# Patient Record
Sex: MALE | Race: BLACK OR AFRICAN AMERICAN | Employment: FULL TIME
[De-identification: names, ages, dates, MRNs, and addresses within clinical notes are randomized per-mention and may not be internally consistent; named-entity substitution may affect disease eponyms.]

---
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
## 2021-06-29 ENCOUNTER — HOSPITAL ENCOUNTER (OUTPATIENT)
Dept: MRI IMAGING | Age: 21
Discharge: HOME | End: 2021-07-01
Payer: COMMERCIAL

## 2021-06-29 DIAGNOSIS — H53.9 VISION CHANGES: ICD-10-CM

## 2021-06-29 DIAGNOSIS — R41.89 BRAIN FOG: ICD-10-CM

## 2021-06-29 DIAGNOSIS — Z87.820 HISTORY OF CONCUSSION: ICD-10-CM

## 2021-06-29 PROCEDURE — 70551 MRI BRAIN STEM W/O DYE: CPT

## 2021-07-02 ENCOUNTER — HOSPITAL ENCOUNTER (OUTPATIENT)
Dept: NEUROLOGY | Age: 21
Discharge: HOME | End: 2021-07-02
Payer: COMMERCIAL

## 2021-07-02 DIAGNOSIS — Z91.09 ENVIRONMENTAL ALLERGIES: ICD-10-CM

## 2021-07-02 DIAGNOSIS — J45.990 BRONCHOSPASM, EXERCISE-INDUCED: ICD-10-CM

## 2021-07-02 PROCEDURE — 94729 DIFFUSING CAPACITY: CPT

## 2021-07-02 PROCEDURE — 6370000000 HC RX 637 (ALT 250 FOR IP): Performed by: NURSE PRACTITIONER

## 2021-07-02 PROCEDURE — 94726 PLETHYSMOGRAPHY LUNG VOLUMES: CPT

## 2021-07-02 PROCEDURE — 94060 EVALUATION OF WHEEZING: CPT

## 2021-07-02 RX ORDER — ALBUTEROL SULFATE 90 UG/1
2 AEROSOL, METERED RESPIRATORY (INHALATION) ONCE
Status: COMPLETED | OUTPATIENT
Start: 2021-07-02 | End: 2021-07-02

## 2021-07-02 RX ADMIN — ALBUTEROL SULFATE 2 PUFF: 90 AEROSOL, METERED RESPIRATORY (INHALATION) at 09:25

## 2021-07-05 LAB
DLCO %PRED: 98 %
DLCO PRED: NORMAL
DLCO/VA %PRED: NORMAL
DLCO/VA PRED: NORMAL
DLCO/VA: NORMAL
DLCO: NORMAL
EXPIRATORY TIME-POST: NORMAL
EXPIRATORY TIME: NORMAL
FEF 25-75% %CHNG: NORMAL
FEF 25-75% %PRED-POST: NORMAL
FEF 25-75% %PRED-PRE: NORMAL
FEF 25-75% PRED: NORMAL
FEF 25-75%-POST: NORMAL
FEF 25-75%-PRE: NORMAL
FEV1 %PRED-POST: 112 %
FEV1 %PRED-PRE: 106 %
FEV1 PRED: NORMAL
FEV1-POST: NORMAL
FEV1-PRE: NORMAL
FEV1/FVC %PRED-POST: NORMAL
FEV1/FVC %PRED-PRE: NORMAL
FEV1/FVC PRED: NORMAL
FEV1/FVC-POST: 104 %
FEV1/FVC-PRE: 104 %
FVC %PRED-POST: NORMAL
FVC %PRED-PRE: NORMAL
FVC PRED: NORMAL
FVC-POST: NORMAL
FVC-PRE: NORMAL
GAW %PRED: NORMAL
GAW PRED: NORMAL
GAW: NORMAL
IC %PRED: NORMAL
IC PRED: NORMAL
IC: NORMAL
MEP: NORMAL
MIP: NORMAL
MVV %PRED-PRE: NORMAL
MVV PRED: NORMAL
MVV-PRE: NORMAL
PEF %PRED-POST: NORMAL
PEF %PRED-PRE: NORMAL
PEF PRED: NORMAL
PEF%CHNG: NORMAL
PEF-POST: NORMAL
PEF-PRE: NORMAL
RAW %PRED: NORMAL
RAW PRED: NORMAL
RAW: NORMAL
RV %PRED: NORMAL
RV PRED: NORMAL
RV: NORMAL
SVC %PRED: NORMAL
SVC PRED: NORMAL
SVC: NORMAL
TLC %PRED: 95 %
TLC PRED: NORMAL
TLC: NORMAL
VA %PRED: NORMAL
VA PRED: NORMAL
VA: NORMAL
VTG %PRED: NORMAL
VTG PRED: NORMAL
VTG: NORMAL

## 2021-07-05 ASSESSMENT — PULMONARY FUNCTION TESTS
FEV1/FVC_PRE: 104
FEV1_PERCENT_PREDICTED_POST: 112
FEV1_PERCENT_PREDICTED_PRE: 106
FEV1/FVC_POST: 104

## 2021-07-06 NOTE — PROCEDURES
[address] [facility] [address]                [address] [place] [address]                               PULMONARY FUNCTION    PATIENT NAME: Anthony Larose                 :        2000  MED REC NO:   [id number]                             ROOM:  ACCOUNT NO:   [de-identified]                           ADMIT DATE: 2021  PROVIDER:     Serena Lee MD    DATE OF PROCEDURE:  2021    TYPE OF STUDY:  Complete PFT. RESULTS:  The patient had FEV1 of 4.5 which is normal at 106% predicted. FVC was 5.01 which is normal at 100% predicted. FEV1/FVC was 90. The  mid-flows were normal at 114% predicted. Total lung capacity was normal  at 95% predicted. The residual volume was increased to 138% predicted. Diffusion was normal at 98% predicted. Following bronchodilators, there  was no significant change in the flows. IMPRESSION:  Air trapping without obstruction or restriction. No  response to bronchodilators. Clinical correlation is recommended.         Marly Jeronimo MD    D: 2021 22:39:10       T: 2021 4:46:14     [id number]  Job#: [id number]     Doc#: [id number]    CC:

## 2021-12-10 ENCOUNTER — HOSPITAL ENCOUNTER (OUTPATIENT)
Age: 21
Setting detail: SPECIMEN
Discharge: HOME | End: 2021-12-10

## 2021-12-10 DIAGNOSIS — Z13.220 SCREENING, LIPID: ICD-10-CM

## 2021-12-11 LAB
CHOLESTEROL, FASTING: 144 MG/DL
CHOLESTEROL/HDL RATIO: 3
HDLC SERPL-MCNC: 48 MG/DL
LDL CHOLESTEROL: 91 MG/DL (ref 0–130)
TRIGLYCERIDE, FASTING: 26 MG/DL
VLDLC SERPL CALC-MCNC: NORMAL MG/DL (ref 1–30)

## 2022-01-03 ENCOUNTER — HOSPITAL ENCOUNTER (OUTPATIENT)
Age: 22
Setting detail: SPECIMEN
Discharge: HOME | End: 2022-01-03

## 2022-01-03 DIAGNOSIS — R20.0 NUMBNESS OF TOES: ICD-10-CM

## 2022-01-03 DIAGNOSIS — Z13.9 SCREENING FOR CONDITION: ICD-10-CM

## 2022-01-03 LAB
ABSOLUTE EOS #: 0.27 K/UL (ref 0–0.44)
ABSOLUTE IMMATURE GRANULOCYTE: <0.03 K/UL (ref 0–0.3)
ABSOLUTE LYMPH #: 2.42 K/UL (ref 1.2–5.2)
ABSOLUTE MONO #: 0.4 K/UL (ref 0.1–1.4)
ALBUMIN SERPL-MCNC: 4.6 G/DL (ref 3.5–5.2)
ALBUMIN/GLOBULIN RATIO: 1.4 (ref 1–2.5)
ALP BLD-CCNC: 71 U/L (ref 40–129)
ALT SERPL-CCNC: 18 U/L (ref 5–41)
ANION GAP SERPL CALCULATED.3IONS-SCNC: 12 MMOL/L (ref 9–17)
AST SERPL-CCNC: 15 U/L
BASOPHILS # BLD: 1 % (ref 0–2)
BASOPHILS ABSOLUTE: 0.03 K/UL (ref 0–0.2)
BILIRUB SERPL-MCNC: 0.3 MG/DL (ref 0.3–1.2)
BUN BLDV-MCNC: 19 MG/DL (ref 6–20)
BUN/CREAT BLD: NORMAL (ref 9–20)
CALCIUM SERPL-MCNC: 9.8 MG/DL (ref 8.6–10.4)
CHLORIDE BLD-SCNC: 98 MMOL/L (ref 98–107)
CO2: 26 MMOL/L (ref 20–31)
CREAT SERPL-MCNC: 0.94 MG/DL (ref 0.7–1.2)
DIFFERENTIAL TYPE: ABNORMAL
EOSINOPHILS RELATIVE PERCENT: 5 % (ref 1–4)
GFR AFRICAN AMERICAN: >60 ML/MIN
GFR NON-AFRICAN AMERICAN: >60 ML/MIN
GFR SERPL CREATININE-BSD FRML MDRD: NORMAL ML/MIN/{1.73_M2}
GFR SERPL CREATININE-BSD FRML MDRD: NORMAL ML/MIN/{1.73_M2}
GLUCOSE BLD-MCNC: 84 MG/DL (ref 70–99)
HCT VFR BLD CALC: 45 % (ref 40.7–50.3)
HEMOGLOBIN: 14 G/DL (ref 13–17)
IMMATURE GRANULOCYTES: 0 %
LYMPHOCYTES # BLD: 43 % (ref 25–45)
MCH RBC QN AUTO: 26.7 PG (ref 25.2–33.5)
MCHC RBC AUTO-ENTMCNC: 31.1 G/DL (ref 28.4–34.8)
MCV RBC AUTO: 85.9 FL (ref 82.6–102.9)
MONOCYTES # BLD: 7 % (ref 2–8)
NRBC AUTOMATED: 0 PER 100 WBC
PDW BLD-RTO: 13.7 % (ref 11.8–14.4)
PLATELET # BLD: 197 K/UL (ref 138–453)
PLATELET ESTIMATE: ABNORMAL
PMV BLD AUTO: 12.1 FL (ref 8.1–13.5)
POTASSIUM SERPL-SCNC: 4.4 MMOL/L (ref 3.7–5.3)
RBC # BLD: 5.24 M/UL (ref 4.21–5.77)
RBC # BLD: ABNORMAL 10*6/UL
SEG NEUTROPHILS: 44 % (ref 34–64)
SEGMENTED NEUTROPHILS ABSOLUTE COUNT: 2.55 K/UL (ref 1.8–8)
SODIUM BLD-SCNC: 136 MMOL/L (ref 135–144)
TOTAL PROTEIN: 7.9 G/DL (ref 6.4–8.3)
WBC # BLD: 5.7 K/UL (ref 4.5–13.5)
WBC # BLD: ABNORMAL 10*3/UL

## 2023-10-12 ENCOUNTER — OFFICE VISIT (OUTPATIENT)
Dept: INTERNAL MEDICINE CLINIC | Facility: CLINIC | Age: 23
End: 2023-10-12

## 2023-10-12 VITALS
DIASTOLIC BLOOD PRESSURE: 80 MMHG | SYSTOLIC BLOOD PRESSURE: 120 MMHG | BODY MASS INDEX: 23.38 KG/M2 | WEIGHT: 167 LBS | HEIGHT: 71 IN | HEART RATE: 84 BPM

## 2023-10-12 DIAGNOSIS — E55.9 VITAMIN D DEFICIENCY: ICD-10-CM

## 2023-10-12 DIAGNOSIS — Z00.00 ANNUAL PHYSICAL EXAM: Primary | ICD-10-CM

## 2023-10-12 DIAGNOSIS — E53.9 VITAMIN B DEFICIENCY: ICD-10-CM

## 2023-10-12 DIAGNOSIS — T78.40XA ALLERGY, INITIAL ENCOUNTER: ICD-10-CM

## 2023-10-12 PROCEDURE — 3074F SYST BP LT 130 MM HG: CPT | Performed by: NURSE PRACTITIONER

## 2023-10-12 PROCEDURE — 3008F BODY MASS INDEX DOCD: CPT | Performed by: NURSE PRACTITIONER

## 2023-10-12 PROCEDURE — 3079F DIAST BP 80-89 MM HG: CPT | Performed by: NURSE PRACTITIONER

## 2023-10-12 PROCEDURE — 99203 OFFICE O/P NEW LOW 30 MIN: CPT | Performed by: NURSE PRACTITIONER

## 2023-10-12 NOTE — ASSESSMENT & PLAN NOTE
Allergies- Swelling of feet and hands. Palms red after eating certain foods.       Plan  -Zyrtec 10 mg p.o. at at bedtime  -Keep windows closed  -Covered pillow with allergic slip cover   Allergy food panel  Annual lab

## 2023-10-16 ENCOUNTER — LAB ENCOUNTER (OUTPATIENT)
Dept: LAB | Age: 23
End: 2023-10-16
Attending: NURSE PRACTITIONER
Payer: COMMERCIAL

## 2023-10-16 DIAGNOSIS — E55.9 VITAMIN D DEFICIENCY: ICD-10-CM

## 2023-10-16 DIAGNOSIS — R74.8 ELEVATED LIVER ENZYMES: Primary | ICD-10-CM

## 2023-10-16 DIAGNOSIS — E53.9 VITAMIN B DEFICIENCY: ICD-10-CM

## 2023-10-16 DIAGNOSIS — Z00.00 ANNUAL PHYSICAL EXAM: ICD-10-CM

## 2023-10-16 LAB
ALBUMIN SERPL-MCNC: 4 G/DL (ref 3.4–5)
ALBUMIN/GLOB SERPL: 1 {RATIO} (ref 1–2)
ALP LIVER SERPL-CCNC: 60 U/L
ALT SERPL-CCNC: 122 U/L
ANION GAP SERPL CALC-SCNC: 7 MMOL/L (ref 0–18)
AST SERPL-CCNC: 86 U/L (ref 15–37)
BILIRUB SERPL-MCNC: 0.4 MG/DL (ref 0.1–2)
BUN BLD-MCNC: 20 MG/DL (ref 7–18)
BUN/CREAT SERPL: 17.4 (ref 10–20)
CALCIUM BLD-MCNC: 9.4 MG/DL (ref 8.5–10.1)
CHLORIDE SERPL-SCNC: 106 MMOL/L (ref 98–112)
CHOLEST SERPL-MCNC: 152 MG/DL (ref ?–200)
CO2 SERPL-SCNC: 29 MMOL/L (ref 21–32)
CREAT BLD-MCNC: 1.15 MG/DL
DEPRECATED RDW RBC AUTO: 40.5 FL (ref 35.1–46.3)
EGFRCR SERPLBLD CKD-EPI 2021: 92 ML/MIN/1.73M2 (ref 60–?)
ERYTHROCYTE [DISTWIDTH] IN BLOOD BY AUTOMATED COUNT: 13.2 % (ref 11–15)
ERYTHROCYTE [SEDIMENTATION RATE] IN BLOOD: 25 MM/HR
FASTING PATIENT LIPID ANSWER: YES
FASTING STATUS PATIENT QL REPORTED: YES
GLOBULIN PLAS-MCNC: 4 G/DL (ref 2.8–4.4)
GLUCOSE BLD-MCNC: 97 MG/DL (ref 70–99)
HCT VFR BLD AUTO: 42.6 %
HDLC SERPL-MCNC: 51 MG/DL (ref 40–59)
HGB BLD-MCNC: 13.9 G/DL
LDLC SERPL CALC-MCNC: 91 MG/DL (ref ?–100)
MCH RBC QN AUTO: 27.6 PG (ref 26–34)
MCHC RBC AUTO-ENTMCNC: 32.6 G/DL (ref 31–37)
MCV RBC AUTO: 84.5 FL
NONHDLC SERPL-MCNC: 101 MG/DL (ref ?–130)
OSMOLALITY SERPL CALC.SUM OF ELEC: 297 MOSM/KG (ref 275–295)
PLATELET # BLD AUTO: 200 10(3)UL (ref 150–450)
POTASSIUM SERPL-SCNC: 4.3 MMOL/L (ref 3.5–5.1)
PROT SERPL-MCNC: 8 G/DL (ref 6.4–8.2)
RBC # BLD AUTO: 5.04 X10(6)UL
SODIUM SERPL-SCNC: 142 MMOL/L (ref 136–145)
TRIGL SERPL-MCNC: 48 MG/DL (ref 30–149)
TSI SER-ACNC: 0.81 MIU/ML (ref 0.36–3.74)
VIT B12 SERPL-MCNC: 570 PG/ML (ref 193–986)
VIT D+METAB SERPL-MCNC: 22.3 NG/ML (ref 30–100)
VLDLC SERPL CALC-MCNC: 8 MG/DL (ref 0–30)
WBC # BLD AUTO: 5.5 X10(3) UL (ref 4–11)

## 2023-10-16 PROCEDURE — 82306 VITAMIN D 25 HYDROXY: CPT

## 2023-10-16 PROCEDURE — 84443 ASSAY THYROID STIM HORMONE: CPT

## 2023-10-16 PROCEDURE — 86003 ALLG SPEC IGE CRUDE XTRC EA: CPT

## 2023-10-16 PROCEDURE — 80061 LIPID PANEL: CPT

## 2023-10-16 PROCEDURE — 85027 COMPLETE CBC AUTOMATED: CPT

## 2023-10-16 PROCEDURE — 36415 COLL VENOUS BLD VENIPUNCTURE: CPT

## 2023-10-16 PROCEDURE — 80053 COMPREHEN METABOLIC PANEL: CPT

## 2023-10-16 PROCEDURE — 82607 VITAMIN B-12: CPT

## 2023-10-16 PROCEDURE — 85652 RBC SED RATE AUTOMATED: CPT

## 2023-10-16 PROCEDURE — 82785 ASSAY OF IGE: CPT

## 2023-10-17 LAB
CLAM IGE QN: 1.23 KUA/L (ref ?–0.1)
CODFISH IGE QN: 0.89 KUA/L (ref ?–0.1)
CORN IGE QN: 10 KUA/L (ref ?–0.1)
COW MILK IGE QN: 0.72 KUA/L (ref ?–0.1)
EGG WHITE IGE QN: 0.37 KUA/L (ref ?–0.1)
IGE SERPL-ACNC: 2073 KU/L (ref 2–214)
PEANUT IGE QN: 9.64 KUA/L (ref ?–0.1)
SCALLOP IGE QN: 5.54 KUA/L (ref ?–0.1)
SESAME SEED IGE QN: 15.6 KUA/L (ref ?–0.1)
SHRIMP IGE QN: 4.77 KUA/L (ref ?–0.1)
SOYBEAN IGE QN: 2.84 KUA/L (ref ?–0.1)
WALNUT IGE QN: 4.23 KUA/L (ref ?–0.1)
WHEAT IGE QN: 9.04 KUA/L (ref ?–0.1)

## 2023-10-24 DIAGNOSIS — T78.40XA ALLERGY, INITIAL ENCOUNTER: Primary | ICD-10-CM

## 2023-10-26 ENCOUNTER — TELEPHONE (OUTPATIENT)
Dept: ALLERGY | Facility: CLINIC | Age: 23
End: 2023-10-26

## 2023-10-26 NOTE — TELEPHONE ENCOUNTER
Spoke with patient. Verified name and . Spoke with patient about upcoming appointment on Wednesday, 23 at 8am, to please arrive 15 minutes prior to appt. At the [facility] location and to please refrain form chandler allergy meds 5 days prior to appointment. Patient verbalizes understanding, no further questions at this time.

## 2023-12-22 ENCOUNTER — LAB ENCOUNTER (OUTPATIENT)
Dept: LAB | Age: 23
End: 2023-12-22
Attending: NURSE PRACTITIONER
Payer: COMMERCIAL

## 2023-12-22 DIAGNOSIS — R74.8 ELEVATED LIVER ENZYMES: ICD-10-CM

## 2023-12-22 PROCEDURE — 36415 COLL VENOUS BLD VENIPUNCTURE: CPT

## 2023-12-22 PROCEDURE — 80053 COMPREHEN METABOLIC PANEL: CPT

## 2023-12-23 LAB
ALBUMIN SERPL-MCNC: 4.4 G/DL (ref 3.2–4.8)
ALBUMIN/GLOB SERPL: 1.3 {RATIO} (ref 1–2)
ALP LIVER SERPL-CCNC: 66 U/L
ALT SERPL-CCNC: 18 U/L
ANION GAP SERPL CALC-SCNC: 4 MMOL/L (ref 0–18)
AST SERPL-CCNC: 23 U/L (ref ?–34)
BILIRUB SERPL-MCNC: 0.6 MG/DL (ref 0.3–1.2)
BUN BLD-MCNC: 11 MG/DL (ref 9–23)
BUN/CREAT SERPL: 9.7 (ref 10–20)
CALCIUM BLD-MCNC: 9.7 MG/DL (ref 8.7–10.4)
CHLORIDE SERPL-SCNC: 105 MMOL/L (ref 98–112)
CO2 SERPL-SCNC: 29 MMOL/L (ref 21–32)
CREAT BLD-MCNC: 1.13 MG/DL
EGFRCR SERPLBLD CKD-EPI 2021: 94 ML/MIN/1.73M2 (ref 60–?)
FASTING STATUS PATIENT QL REPORTED: YES
GLOBULIN PLAS-MCNC: 3.5 G/DL (ref 2.8–4.4)
GLUCOSE BLD-MCNC: 90 MG/DL (ref 70–99)
OSMOLALITY SERPL CALC.SUM OF ELEC: 285 MOSM/KG (ref 275–295)
POTASSIUM SERPL-SCNC: 4.1 MMOL/L (ref 3.5–5.1)
PROT SERPL-MCNC: 7.9 G/DL (ref 5.7–8.2)
SODIUM SERPL-SCNC: 138 MMOL/L (ref 136–145)

## 2024-01-22 ENCOUNTER — OFFICE VISIT (OUTPATIENT)
Dept: RHEUMATOLOGY | Facility: CLINIC | Age: 24
End: 2024-01-22

## 2024-01-22 VITALS
HEIGHT: 71 IN | BODY MASS INDEX: 24.36 KG/M2 | WEIGHT: 174 LBS | HEART RATE: 66 BPM | SYSTOLIC BLOOD PRESSURE: 132 MMHG | DIASTOLIC BLOOD PRESSURE: 73 MMHG

## 2024-01-22 DIAGNOSIS — M25.50 POLYARTHRALGIA: Primary | ICD-10-CM

## 2024-01-22 DIAGNOSIS — R70.0 ESR RAISED: ICD-10-CM

## 2024-01-22 PROCEDURE — 3078F DIAST BP <80 MM HG: CPT | Performed by: INTERNAL MEDICINE

## 2024-01-22 PROCEDURE — 3075F SYST BP GE 130 - 139MM HG: CPT | Performed by: INTERNAL MEDICINE

## 2024-01-22 PROCEDURE — 99244 OFF/OP CNSLTJ NEW/EST MOD 40: CPT | Performed by: INTERNAL MEDICINE

## 2024-01-22 PROCEDURE — 3008F BODY MASS INDEX DOCD: CPT | Performed by: INTERNAL MEDICINE

## 2024-01-22 NOTE — PATIENT INSTRUCTIONS
You were seen today for joint pain and slightly high inflammation marker  Lets evaluate to see if anything autoimmune is going on  Blood work in the past was negative for lupus  I will send you message on onefinestay with results in the next week or so

## 2024-01-22 NOTE — PROGRESS NOTES
Benito Recio is a 23 year old male who presents for   Chief Complaint   Patient presents with    New Patient     Sed Rate elevated F/u     Joint Pain   .   HPI:   CC: joint pain  Consult: referred by NP Eliana Patel     This is a 22 yo F with hx of Subarachnoid hemorrhage due to a fall Feb 2023, Alopecia areata presents with polyarthralgia's.  He reports joint pain starting a few months ago involving his knees and ankles.  Joints feel inflamed.  He feels more body aches and joint pain after he works out.  At times his feet will swell.  Denies any rash or psoriasis.  Denies any lower back pain.  He does have alopecia.  Blood work shows slightly elevated ESR of 25.  He also reports a history of Raynaud's.  His mom has lupus.      Blood work:  Neg CATRACHITA, RF in 2019  ESR 25      Wt Readings from Last 2 Encounters:   01/22/24 174 lb (78.9 kg)   10/12/23 167 lb (75.8 kg)     Body mass index is 24.27 kg/m².      No current outpatient medications on file.      Past Medical History:   Diagnosis Date    Alopecia       No past surgical history on file.   Family History   Problem Relation Age of Onset    Hypertension Mother     Lipids Mother     Kidney Disease Mother       Social History:  Social History     Socioeconomic History    Marital status: Single   Tobacco Use    Smoking status: Never    Smokeless tobacco: Never   Vaping Use    Vaping Use: Never used   Substance and Sexual Activity    Alcohol use: Yes    Drug use: Not Currently           REVIEW OF SYSTEMS:   Review Of Systems:  Constitutional: No fever, no change in weight or appetitie  Derm: No rashes, no oral ulcers, + alopecia, no photosensitivity, no psoriasis  HEENT: No dry eyes, no dry mouth, no Raynaud's, no nasal ulcers, no parotid swelling, no neck pain, no jaw pain, no temple pain  Eyes: No visual changes,   CVS: No chest pain, no heart disease  RS: No SOB, no Cough, No Pleurtic pain,   GI: No nausea, no vomiiting, no abominal pain, no hx of ulcer, no  gastritis, no heartburn, no dyshpagia, no BRBPR or melena  : no dysuria, no hx of miscarriages, no DVT Hx, no hx of OCP,   Neuro: No numbness or tingling, no headache, no hx of seizures,   Psych: no hx of anxiety or depression  ENDO: no hx of thyroid disease, no hx of DM  Joint/Muscluskeltal: see HPI,   All other ROS are negative.     EXAM:   /73 (BP Location: Left arm, Patient Position: Sitting, Cuff Size: adult)   Pulse 66   Ht 5' 11\" (1.803 m)   Wt 174 lb (78.9 kg)   BMI 24.27 kg/m²   GEN: AAOx3, NAD  HEENT: EOMI, PERRLA, no injection or icterus, oral mucosa moist, no oral lesions. No lymphadenopathy. No facial rash  CVS: RRR, no murmurs rubs or gallops. Equal 2+ distal pulses.   LUNGS: CTAB, no increased work of breathing  ABDOMEN:  soft NT/ND, +BS, no HSM  SKIN: No rashes or skin lesions. No nail findings  MSK:  Cervical spine: FROM  Hands: no synovitis in DIP, PIP and MCP, strong full fists  Wrist: FROM, no pain or swelling or warmth on palpation  Elbow: FROM, no pain or swelling or warmth on palpation  Shoulders: FROM, no pain or swelling or warmth on palpation  Hip: normal log roll, no lateral hip pain, NIYA test negative b/l  Knees: FROM, no warmth or effusion present. No pain with ROM.   Ankles: FROM, no pain or swelling or warmth on palpation  Feet: no pain with MTP squeeze, no toe swelling or pain or warmth on palpation with FROM  Spine: no lumbar or sacral pain on palpation.  NEURO: Cranial nerves II-XII intact grossly. 5/5 strength throughout in both upper and lower extremities, sensation intact.  PSYCH: normal mood    LABS:     Component      Latest Ref Rng 10/16/2023   Glucose      70 - 99 mg/dL 97    Sodium      136 - 145 mmol/L 142    Potassium      3.5 - 5.1 mmol/L 4.3    Chloride      98 - 112 mmol/L 106    Carbon Dioxide, Total      21.0 - 32.0 mmol/L 29.0    ANION GAP      0 - 18 mmol/L 7    BUN      7 - 18 mg/dL 20 (H)    CREATININE      0.70 - 1.30 mg/dL 1.15    BUN/CREATININE  RATIO      10.0 - 20.0  17.4    CALCIUM      8.5 - 10.1 mg/dL 9.4    CALCULATED OSMOLALITY      275 - 295 mOsm/kg 297 (H)    EGFR      >=60 mL/min/1.73m2 92    ALT (SGPT)      16 - 61 U/L 122 (H)    AST (SGOT)      15 - 37 U/L 86 (H)    ALKALINE PHOSPHATASE      45 - 117 U/L 60    Total Bilirubin      0.1 - 2.0 mg/dL 0.4    PROTEIN, TOTAL      6.4 - 8.2 g/dL 8.0    Albumin      3.4 - 5.0 g/dL 4.0    Globulin      2.8 - 4.4 g/dL 4.0    A/G Ratio      1.0 - 2.0  1.0    Patient Fasting for CMP? Yes    WBC      4.0 - 11.0 x10(3) uL 5.5    RBC      4.30 - 5.70 x10(6)uL 5.04    Hemoglobin      13.0 - 17.5 g/dL 13.9    Hematocrit      39.0 - 53.0 % 42.6    MCV      80.0 - 100.0 fL 84.5    MCH      26.0 - 34.0 pg 27.6    MCHC      31.0 - 37.0 g/dL 32.6    RDW      11.0 - 15.0 % 13.2    RDW-SD      35.1 - 46.3 fL 40.5    Platelet Count      150.0 - 450.0 10(3)uL 200.0    SED RATE      0 - 15 mm/Hr 25 (H)         IMAGING:     None    ASSESSMENT AND PLAN:     Polyarthralgias  - Reports joint pain starting a few months ago and his knees and ankles.  At times he will have some swelling.  Blood work showed slightly elevated ESR of 25.  Mom has a history of lupus  - No evidence of synovitis or swelling on exam.  No history of psoriasis or lower back pain.  Previous blood work in 2019 showed negative CATRACHITA and RF.  - Plan to obtain further blood work to evaluate for inflammatory arthritis but suspicion is low  - He states that his pain is not severe, he will take Tylenol as needed for his pain    Alopecia Areata   - Chronic and stable    Thank you for allowing me to participate in this patients care.     Erin Barlow MD  1/22/2024  9:19 AM

## 2024-01-29 ENCOUNTER — LAB ENCOUNTER (OUTPATIENT)
Dept: LAB | Facility: HOSPITAL | Age: 24
End: 2024-01-29
Attending: INTERNAL MEDICINE
Payer: COMMERCIAL

## 2024-01-29 LAB
ANTIBODY SCREEN: NEGATIVE
CRP SERPL-MCNC: <0.4 MG/DL (ref ?–1)
ERYTHROCYTE [SEDIMENTATION RATE] IN BLOOD: 7 MM/HR
RH BLOOD TYPE: POSITIVE
RH BLOOD TYPE: POSITIVE
RHEUMATOID FACT SERPL-ACNC: <10 IU/ML (ref ?–14)

## 2024-01-29 PROCEDURE — 86900 BLOOD TYPING SEROLOGIC ABO: CPT | Performed by: INTERNAL MEDICINE

## 2024-01-29 PROCEDURE — 85652 RBC SED RATE AUTOMATED: CPT | Performed by: INTERNAL MEDICINE

## 2024-01-29 PROCEDURE — 86140 C-REACTIVE PROTEIN: CPT | Performed by: INTERNAL MEDICINE

## 2024-01-29 PROCEDURE — 86901 BLOOD TYPING SEROLOGIC RH(D): CPT | Performed by: INTERNAL MEDICINE

## 2024-01-29 PROCEDURE — 86850 RBC ANTIBODY SCREEN: CPT | Performed by: INTERNAL MEDICINE

## 2024-01-29 PROCEDURE — 36415 COLL VENOUS BLD VENIPUNCTURE: CPT | Performed by: INTERNAL MEDICINE

## 2024-01-29 PROCEDURE — 86200 CCP ANTIBODY: CPT | Performed by: INTERNAL MEDICINE

## 2024-01-29 PROCEDURE — 86431 RHEUMATOID FACTOR QUANT: CPT | Performed by: INTERNAL MEDICINE

## 2024-01-30 LAB — CCP IGG SERPL-ACNC: 1.7 U/ML (ref 0–6.9)

## 2024-09-24 ENCOUNTER — LAB ENCOUNTER (OUTPATIENT)
Dept: LAB | Age: 24
End: 2024-09-24
Payer: COMMERCIAL

## 2024-09-24 ENCOUNTER — OFFICE VISIT (OUTPATIENT)
Dept: FAMILY MEDICINE CLINIC | Facility: CLINIC | Age: 24
End: 2024-09-24
Payer: COMMERCIAL

## 2024-09-24 VITALS
HEIGHT: 71 IN | SYSTOLIC BLOOD PRESSURE: 129 MMHG | OXYGEN SATURATION: 100 % | WEIGHT: 167.5 LBS | DIASTOLIC BLOOD PRESSURE: 70 MMHG | TEMPERATURE: 98 F | RESPIRATION RATE: 16 BRPM | HEART RATE: 59 BPM | BODY MASS INDEX: 23.45 KG/M2

## 2024-09-24 DIAGNOSIS — Z87.828 HISTORY OF TRAUMATIC HEAD INJURY: ICD-10-CM

## 2024-09-24 DIAGNOSIS — Z11.3 SCREEN FOR STD (SEXUALLY TRANSMITTED DISEASE): ICD-10-CM

## 2024-09-24 DIAGNOSIS — R51.9 NONINTRACTABLE EPISODIC HEADACHE, UNSPECIFIED HEADACHE TYPE: ICD-10-CM

## 2024-09-24 DIAGNOSIS — Z00.00 ROUTINE PHYSICAL EXAMINATION: ICD-10-CM

## 2024-09-24 DIAGNOSIS — R53.83 FATIGUE, UNSPECIFIED TYPE: Primary | ICD-10-CM

## 2024-09-24 DIAGNOSIS — R53.83 FATIGUE, UNSPECIFIED TYPE: ICD-10-CM

## 2024-09-24 DIAGNOSIS — R42 DIZZINESS: ICD-10-CM

## 2024-09-24 DIAGNOSIS — R42 EPISODIC LIGHTHEADEDNESS: ICD-10-CM

## 2024-09-24 LAB
ALBUMIN SERPL-MCNC: 5 G/DL (ref 3.2–4.8)
ALBUMIN/GLOB SERPL: 1.3 {RATIO} (ref 1–2)
ALP LIVER SERPL-CCNC: 80 U/L
ALT SERPL-CCNC: 23 U/L
ANION GAP SERPL CALC-SCNC: 4 MMOL/L (ref 0–18)
AST SERPL-CCNC: 20 U/L (ref ?–34)
BASOPHILS # BLD AUTO: 0.05 X10(3) UL (ref 0–0.2)
BASOPHILS NFR BLD AUTO: 0.9 %
BILIRUB SERPL-MCNC: 0.6 MG/DL (ref 0.3–1.2)
BUN BLD-MCNC: 11 MG/DL (ref 9–23)
BUN/CREAT SERPL: 9.7 (ref 10–20)
CALCIUM BLD-MCNC: 10.2 MG/DL (ref 8.7–10.4)
CHLORIDE SERPL-SCNC: 103 MMOL/L (ref 98–112)
CHOLEST SERPL-MCNC: 177 MG/DL (ref ?–200)
CO2 SERPL-SCNC: 29 MMOL/L (ref 21–32)
CREAT BLD-MCNC: 1.13 MG/DL
DEPRECATED HBV CORE AB SER IA-ACNC: 104 NG/ML
DEPRECATED RDW RBC AUTO: 39.2 FL (ref 35.1–46.3)
EGFRCR SERPLBLD CKD-EPI 2021: 94 ML/MIN/1.73M2 (ref 60–?)
EOSINOPHIL # BLD AUTO: 0.16 X10(3) UL (ref 0–0.7)
EOSINOPHIL NFR BLD AUTO: 2.7 %
ERYTHROCYTE [DISTWIDTH] IN BLOOD BY AUTOMATED COUNT: 13.1 % (ref 11–15)
EST. AVERAGE GLUCOSE BLD GHB EST-MCNC: 123 MG/DL (ref 68–126)
FASTING PATIENT LIPID ANSWER: YES
FASTING STATUS PATIENT QL REPORTED: YES
GLOBULIN PLAS-MCNC: 3.8 G/DL (ref 2–3.5)
GLUCOSE BLD-MCNC: 99 MG/DL (ref 70–99)
HBA1C MFR BLD: 5.9 % (ref ?–5.7)
HBV SURFACE AG SER-ACNC: 0.41 [IU]/L
HBV SURFACE AG SERPL QL IA: NONREACTIVE
HCT VFR BLD AUTO: 43.1 %
HDLC SERPL-MCNC: 53 MG/DL (ref 40–59)
HGB BLD-MCNC: 14.6 G/DL
IMM GRANULOCYTES # BLD AUTO: 0.01 X10(3) UL (ref 0–1)
IMM GRANULOCYTES NFR BLD: 0.2 %
IRON SATN MFR SERPL: 13 %
IRON SERPL-MCNC: 52 UG/DL
LDLC SERPL CALC-MCNC: 114 MG/DL (ref ?–100)
LYMPHOCYTES # BLD AUTO: 1.35 X10(3) UL (ref 1–4)
LYMPHOCYTES NFR BLD AUTO: 23.1 %
MCH RBC QN AUTO: 27.9 PG (ref 26–34)
MCHC RBC AUTO-ENTMCNC: 33.9 G/DL (ref 31–37)
MCV RBC AUTO: 82.4 FL
MONOCYTES # BLD AUTO: 0.38 X10(3) UL (ref 0.1–1)
MONOCYTES NFR BLD AUTO: 6.5 %
NEUTROPHILS # BLD AUTO: 3.9 X10 (3) UL (ref 1.5–7.7)
NEUTROPHILS # BLD AUTO: 3.9 X10(3) UL (ref 1.5–7.7)
NEUTROPHILS NFR BLD AUTO: 66.6 %
NONHDLC SERPL-MCNC: 124 MG/DL (ref ?–130)
OSMOLALITY SERPL CALC.SUM OF ELEC: 281 MOSM/KG (ref 275–295)
PLATELET # BLD AUTO: 195 10(3)UL (ref 150–450)
POTASSIUM SERPL-SCNC: 4 MMOL/L (ref 3.5–5.1)
PROT SERPL-MCNC: 8.8 G/DL (ref 5.7–8.2)
RBC # BLD AUTO: 5.23 X10(6)UL
SODIUM SERPL-SCNC: 136 MMOL/L (ref 136–145)
T PALLIDUM AB SER QL IA: NONREACTIVE
TIBC SERPL-MCNC: 396 UG/DL (ref 250–425)
TRANSFERRIN SERPL-MCNC: 266 MG/DL (ref 215–365)
TRIGL SERPL-MCNC: 50 MG/DL (ref 30–149)
TSI SER-ACNC: 0.67 MIU/ML (ref 0.55–4.78)
VLDLC SERPL CALC-MCNC: 9 MG/DL (ref 0–30)
WBC # BLD AUTO: 5.9 X10(3) UL (ref 4–11)

## 2024-09-24 PROCEDURE — 83036 HEMOGLOBIN GLYCOSYLATED A1C: CPT

## 2024-09-24 PROCEDURE — 36415 COLL VENOUS BLD VENIPUNCTURE: CPT

## 2024-09-24 PROCEDURE — 84466 ASSAY OF TRANSFERRIN: CPT

## 2024-09-24 PROCEDURE — 87340 HEPATITIS B SURFACE AG IA: CPT

## 2024-09-24 PROCEDURE — 86780 TREPONEMA PALLIDUM: CPT

## 2024-09-24 PROCEDURE — 99395 PREV VISIT EST AGE 18-39: CPT

## 2024-09-24 PROCEDURE — 87491 CHLMYD TRACH DNA AMP PROBE: CPT

## 2024-09-24 PROCEDURE — 84443 ASSAY THYROID STIM HORMONE: CPT

## 2024-09-24 PROCEDURE — 86803 HEPATITIS C AB TEST: CPT

## 2024-09-24 PROCEDURE — 87522 HEPATITIS C REVRS TRNSCRPJ: CPT

## 2024-09-24 PROCEDURE — 87591 N.GONORRHOEAE DNA AMP PROB: CPT

## 2024-09-24 PROCEDURE — 3078F DIAST BP <80 MM HG: CPT

## 2024-09-24 PROCEDURE — 3074F SYST BP LT 130 MM HG: CPT

## 2024-09-24 PROCEDURE — 87389 HIV-1 AG W/HIV-1&-2 AB AG IA: CPT

## 2024-09-24 PROCEDURE — 83540 ASSAY OF IRON: CPT

## 2024-09-24 PROCEDURE — 3008F BODY MASS INDEX DOCD: CPT

## 2024-09-24 PROCEDURE — 80061 LIPID PANEL: CPT

## 2024-09-24 PROCEDURE — 85025 COMPLETE CBC W/AUTO DIFF WBC: CPT

## 2024-09-24 PROCEDURE — 82728 ASSAY OF FERRITIN: CPT

## 2024-09-24 PROCEDURE — 80053 COMPREHEN METABOLIC PANEL: CPT

## 2024-09-24 RX ORDER — MINOCYCLINE HYDROCHLORIDE 100 MG/1
100 CAPSULE ORAL 2 TIMES DAILY
COMMUNITY
Start: 2023-01-25 | End: 2024-09-24 | Stop reason: ALTCHOICE

## 2024-09-24 NOTE — PROGRESS NOTES
HPI:    Patient ID: Benito Recio is a 24 year old male.    HPI     Patient here in office for physical.  Sexually active and interested in STI testing.      Reports history of head injury in February 2023 after hitting his head on a bathtub and losing consciousness.  CT brain during time of injury showed subarachnoid hemorrhage.  Patient reports intermittent headaches, lightheadedness, and dizziness, unsure if related to head injury.  Reports decreased sensation on right side of face during normal exam, hit head on right side during injury in 2023.      Also complains of fatigue, unsure if related to head injury.    Review of Systems   Constitutional:  Positive for fatigue. Negative for fever.   HENT: Negative.  Negative for ear pain and sore throat.    Eyes: Negative.  Negative for visual disturbance.   Respiratory: Negative.  Negative for cough and shortness of breath.    Cardiovascular: Negative.  Negative for chest pain and palpitations.   Gastrointestinal: Negative.  Negative for abdominal pain, blood in stool, constipation and diarrhea.   Genitourinary: Negative.  Negative for dysuria, frequency and hematuria.   Musculoskeletal: Negative.  Negative for arthralgias and myalgias.   Skin: Negative.  Negative for rash.   Neurological:  Positive for dizziness, light-headedness and headaches. Negative for facial asymmetry.   Psychiatric/Behavioral: Negative.              No current outpatient medications on file.     Allergies:  Allergies   Allergen Reactions    Seasonal UNKNOWN      /70   Pulse 59   Temp 97.7 °F (36.5 °C) (Temporal)   Resp 16   Ht 5' 11\" (1.803 m)   Wt 167 lb 8 oz (76 kg)   SpO2 100%   BMI 23.36 kg/m²   Body mass index is 23.36 kg/m².  PHYSICAL EXAM:   Physical Exam  Vitals reviewed.   Constitutional:       General: He is not in acute distress.     Appearance: Normal appearance. He is well-developed and normal weight. He is not ill-appearing.   HENT:      Right Ear: Tympanic  membrane, ear canal and external ear normal. There is no impacted cerumen.      Left Ear: Tympanic membrane, ear canal and external ear normal. There is no impacted cerumen.      Nose: Nose normal. No congestion.      Mouth/Throat:      Mouth: Mucous membranes are moist.      Pharynx: No oropharyngeal exudate or posterior oropharyngeal erythema.   Eyes:      General:         Right eye: No discharge.         Left eye: No discharge.      Extraocular Movements: Extraocular movements intact.      Pupils: Pupils are equal, round, and reactive to light.   Cardiovascular:      Rate and Rhythm: Normal rate and regular rhythm.      Heart sounds: Normal heart sounds. No murmur heard.     No friction rub. No gallop.   Pulmonary:      Effort: Pulmonary effort is normal.      Breath sounds: Normal breath sounds.   Abdominal:      General: Bowel sounds are normal. There is no distension.      Palpations: Abdomen is soft. There is no mass.      Tenderness: There is no abdominal tenderness. There is no right CVA tenderness, left CVA tenderness, guarding or rebound.      Hernia: No hernia is present.   Musculoskeletal:         General: No tenderness. Normal range of motion.      Cervical back: Neck supple.   Lymphadenopathy:      Cervical: No cervical adenopathy.   Skin:     General: Skin is warm and dry.      Findings: No rash.   Neurological:      General: No focal deficit present.      Mental Status: He is alert and oriented to person, place, and time.      Cranial Nerves: Cranial nerve deficit present.      Sensory: No sensory deficit.      Motor: No weakness.      Deep Tendon Reflexes: Reflexes are normal and symmetric.      Comments: CNII-VI, VIII-XII grossly intact. CN VII deficit    Psychiatric:         Mood and Affect: Mood normal.         Behavior: Behavior normal.         Thought Content: Thought content normal.         Judgment: Judgment normal.                ASSESSMENT/PLAN:   1. Fatigue, unspecified type  - Hemoglobin  A1C [E]; Future  - Ferritin [E]; Future  - Iron And Tibc [E]; Future    2. Routine physical examination  - CBC With Differential With Platelet; Future  - Comp Metabolic Panel (14); Future  - Lipid Panel; Future  - TSH W Reflex To Free T4 [E]; Future    3. History of traumatic head injury  - Neuro Referral - In Network    4. Nonintractable episodic headache, unspecified headache type  - Referral placed to neurology     5. Episodic lightheadedness  - Neuro Referral - In Network    6. Dizziness  - Neuro Referral - In Network    7. Screen for STD (sexually transmitted disease)  - HCV Antibody; Future  - Hepatitis B Surface Antigen; Future  - HIV AG AB Combo; Future  - T PALLIDUM SCREENING CASCADE; Future  - Chlamydia/Gc Amplification; Future      Orders Placed This Encounter   Procedures    CBC With Differential With Platelet    Comp Metabolic Panel (14)    Lipid Panel    TSH W Reflex To Free T4 [E]    Hemoglobin A1C [E]    Ferritin [E]    Iron And Tibc [E]    HCV Antibody    Hepatitis B Surface Antigen    HIV AG AB Combo    T PALLIDUM SCREENING CASCADE    Chlamydia/Gc Amplification       Meds This Visit:  Requested Prescriptions      No prescriptions requested or ordered in this encounter       Imaging & Referrals:  NEURO - INTERNAL         ID#[id number]

## 2024-09-25 LAB
C TRACH DNA SPEC QL NAA+PROBE: NEGATIVE
N GONORRHOEA DNA SPEC QL NAA+PROBE: NEGATIVE

## 2024-11-19 ENCOUNTER — OFFICE VISIT (OUTPATIENT)
Dept: NEUROLOGY | Facility: CLINIC | Age: 24
End: 2024-11-19
Payer: COMMERCIAL

## 2024-11-19 VITALS
HEIGHT: 71 IN | BODY MASS INDEX: 23.1 KG/M2 | HEART RATE: 59 BPM | SYSTOLIC BLOOD PRESSURE: 119 MMHG | DIASTOLIC BLOOD PRESSURE: 71 MMHG | WEIGHT: 165 LBS

## 2024-11-19 DIAGNOSIS — S06.9X9S TRAUMATIC BRAIN INJURY WITH LOSS OF CONSCIOUSNESS, SEQUELA (HCC): Primary | ICD-10-CM

## 2024-11-19 PROCEDURE — 3074F SYST BP LT 130 MM HG: CPT | Performed by: OTHER

## 2024-11-19 PROCEDURE — 3008F BODY MASS INDEX DOCD: CPT | Performed by: OTHER

## 2024-11-19 PROCEDURE — 3078F DIAST BP <80 MM HG: CPT | Performed by: OTHER

## 2024-11-19 PROCEDURE — 99203 OFFICE O/P NEW LOW 30 MIN: CPT | Performed by: OTHER

## 2024-11-19 NOTE — PROGRESS NOTES
[facility]  [address] [place] [address], [address]  [facility] [address]  [phone]            Neurology Initial Visit     Referred By: Dr. Cabrera    Chief Complaint:   Chief Complaint   Patient presents with    Neurologic Problem     New patient presents with Subarachnoid hemorrhage and states he has drooping on the right side of his face usually noticed when he is stressed.        HPI:     Benito Recio is a 24 year old male, who presents for history of traumatic brain injury.  In April 2023 patient of the night of drinking apparently passed out in the bath, hit his head in the bathtub, he was unconscious for unknown period of time, he woke up, he ate, but then she started not feel well a week he came to the hospital.  He was found to have small amounts of not hemorrhage, no surgical intervention was required.  He was [place] for few days.  He was treated with Keppra for 7 days.  He recovered well.  However since then when he gets stressed symptoms he feels feeling of tightness over the right side of the face he could not fully define the feeling.  He denies any other neurological symptoms otherwise.     Past Medical History:    Alopecia       Past Surgical History:   Procedure Laterality Date    Appendectomy      2012       Social history:  History   Smoking Status    Never   Smokeless Tobacco    Never     History   Alcohol Use    Yes     Comment: social     History   Drug Use    Types: Cannabis     Comment: rare; once every 3 months       Family History   Problem Relation Age of Onset    Hypertension Mother     Lipids Mother     Kidney Disease Mother        No current outpatient medications on file.    Allergies[1]    ROS:   As in HPI, the rest of the 14 system review was done and was negative      Physical Exam:  Vitals:    11/19/24 0746   BP: 119/71   Pulse: 59   Weight: 165 lb (74.8 kg)   Height: 71\"       General: No apparent distress, well nourished, well groomed.  Head-  Normocephalic, atraumatic  Eyes- No redness or swelling  ENT- Hearing intake, normal glutition  Neck- No masses or adenopathy  Cv: pulses were palpable and normal, no cyanosis or edema     Neurological:     Mental Status- Alert and oriented x3.  Normal attention span and concentration  Thought process intact  Memory intact- recent and remote  Mood intact  Fund of knowledge appropriate for education and age    Language intact including: comprehension, naming, repetition, vocabulary    Cranial Nerves:  II.- Visual fields full to confrontation        Fundoscopically- No papilledema or retinal hemorrhages. Normal optic discs, sharp edges.  III, IV, VI- EOM intact, BROOKE  V. Facial sensation intact  VII. Face symmetric, no facial weakness  VIII. Hearing intact to whisper.  IX. Pallet elevates symmetrically.  XI. Shoulder shrug is intact  XII. Tongue is midline    Motor Exam:  Muscle tone normal  No atrophy or fasciculations  Strength- upper extremities 5/5 proximally and distally                  - lower  extremities 5/5 proximally and distally    Sensory Exam:  Light touch sensation- intact in all 4 extremities    Deep Tendon Reflexes:  Biceps 2+ bilateral symmetric  Triceps 2+ bilateral symmetric  Brachioradialis 2 + bilateral symmetric  Patellar 2+ bilateral symmetric  Ankle jerk 2+ bilateral symmetric    No clonus  No Babinski sign    Coordination:  Finger to nose intact  Rapid alternating movements intact    Gait:  Normal posture  Normal physiologic      Labs:    Lab Results   Component Value Date    TSH 0.672 09/24/2024     Lab Results   Component Value Date    HDL 53 09/24/2024     (H) 09/24/2024    TRIG 50 09/24/2024     Lab Results   Component Value Date    HGB 14.6 09/24/2024    HCT 43.1 09/24/2024    MCV 82.4 09/24/2024    WBC 5.9 09/24/2024    .0 09/24/2024      Lab Results   Component Value Date    BUN 11 09/24/2024    CA 10.2 09/24/2024    ALT 23 09/24/2024    AST 20 09/24/2024    ALB 5.0 (H)  09/24/2024     09/24/2024    K 4.0 09/24/2024     09/24/2024    CO2 29.0 09/24/2024      I have reviewed labs.  I have reviewed prior records from outside hospital.    Assessment   1. Traumatic brain injury with loss of consciousness, sequela (HCC)  Seems to have recovered well, except for some transient feeling of weakness/tightness of the right side of the face when he is stressed.  Therefore MRI of the brain will be done and EEG also will be done.  Otherwise patient is reassured about lack of any clinical findings so far.    - MRI BRAIN (CPT=70551); Future  - EEG (At [facility]); Future           Education and counseling provided to patient. Instructed patient to call my office or seek medical attention immediately if symptoms worsen.  Patient verbalized understanding of information given. All questions were answered. All side effects of drugs were discussed.     Return to clinic in: Return if symptoms worsen or fail to improve.    Idris Famrer MD           [1]   Allergies  Allergen Reactions    Seasonal UNKNOWN

## 2024-12-30 ENCOUNTER — APPOINTMENT (OUTPATIENT)
Dept: CT IMAGING | Facility: HOSPITAL | Age: 24
End: 2024-12-30
Attending: EMERGENCY MEDICINE
Payer: COMMERCIAL

## 2024-12-30 ENCOUNTER — APPOINTMENT (OUTPATIENT)
Dept: GENERAL RADIOLOGY | Facility: HOSPITAL | Age: 24
End: 2024-12-30
Attending: EMERGENCY MEDICINE
Payer: COMMERCIAL

## 2024-12-30 ENCOUNTER — HOSPITAL ENCOUNTER (EMERGENCY)
Facility: HOSPITAL | Age: 24
Discharge: HOME | End: 2024-12-30
Attending: EMERGENCY MEDICINE
Payer: COMMERCIAL

## 2024-12-30 VITALS
RESPIRATION RATE: 13 BRPM | HEART RATE: 64 BPM | TEMPERATURE: 98 F | OXYGEN SATURATION: 98 % | DIASTOLIC BLOOD PRESSURE: 86 MMHG | SYSTOLIC BLOOD PRESSURE: 132 MMHG

## 2024-12-30 DIAGNOSIS — R11.2 NAUSEA VOMITING AND DIARRHEA: Primary | ICD-10-CM

## 2024-12-30 DIAGNOSIS — R19.7 NAUSEA VOMITING AND DIARRHEA: Primary | ICD-10-CM

## 2024-12-30 DIAGNOSIS — R55 SYNCOPE, UNSPECIFIED SYNCOPE TYPE: ICD-10-CM

## 2024-12-30 DIAGNOSIS — S09.90XA CLOSED HEAD INJURY, INITIAL ENCOUNTER: ICD-10-CM

## 2024-12-30 LAB
ALBUMIN SERPL-MCNC: 5.2 G/DL (ref 3.2–4.8)
ALBUMIN/GLOB SERPL: 1.4 {RATIO} (ref 1–2)
ALP LIVER SERPL-CCNC: 68 U/L
ALT SERPL-CCNC: 22 U/L
ANION GAP SERPL CALC-SCNC: 9 MMOL/L (ref 0–18)
AST SERPL-CCNC: 26 U/L (ref ?–34)
BASOPHILS # BLD AUTO: 0.02 X10(3) UL (ref 0–0.2)
BASOPHILS NFR BLD AUTO: 0.3 %
BILIRUB SERPL-MCNC: 0.9 MG/DL (ref 0.3–1.2)
BUN BLD-MCNC: 19 MG/DL (ref 9–23)
BUN/CREAT SERPL: 14.6 (ref 10–20)
CALCIUM BLD-MCNC: 10.6 MG/DL (ref 8.7–10.4)
CHLORIDE SERPL-SCNC: 105 MMOL/L (ref 98–112)
CO2 SERPL-SCNC: 25 MMOL/L (ref 21–32)
CREAT BLD-MCNC: 1.3 MG/DL
DEPRECATED RDW RBC AUTO: 38.1 FL (ref 35.1–46.3)
EGFRCR SERPLBLD CKD-EPI 2021: 79 ML/MIN/1.73M2 (ref 60–?)
EOSINOPHIL # BLD AUTO: 0.18 X10(3) UL (ref 0–0.7)
EOSINOPHIL NFR BLD AUTO: 2.4 %
ERYTHROCYTE [DISTWIDTH] IN BLOOD BY AUTOMATED COUNT: 12.7 % (ref 11–15)
GLOBULIN PLAS-MCNC: 3.6 G/DL (ref 2–3.5)
GLUCOSE BLD-MCNC: 114 MG/DL (ref 70–99)
HCT VFR BLD AUTO: 47.2 %
HGB BLD-MCNC: 16.4 G/DL
IMM GRANULOCYTES # BLD AUTO: 0.03 X10(3) UL (ref 0–1)
IMM GRANULOCYTES NFR BLD: 0.4 %
LIPASE SERPL-CCNC: 37 U/L (ref 12–53)
LYMPHOCYTES # BLD AUTO: 0.45 X10(3) UL (ref 1–4)
LYMPHOCYTES NFR BLD AUTO: 6.1 %
MCH RBC QN AUTO: 28.6 PG (ref 26–34)
MCHC RBC AUTO-ENTMCNC: 34.7 G/DL (ref 31–37)
MCV RBC AUTO: 82.2 FL
MONOCYTES # BLD AUTO: 0.33 X10(3) UL (ref 0.1–1)
MONOCYTES NFR BLD AUTO: 4.5 %
NEUTROPHILS # BLD AUTO: 6.35 X10 (3) UL (ref 1.5–7.7)
NEUTROPHILS # BLD AUTO: 6.35 X10(3) UL (ref 1.5–7.7)
NEUTROPHILS NFR BLD AUTO: 86.3 %
OSMOLALITY SERPL CALC.SUM OF ELEC: 291 MOSM/KG (ref 275–295)
PLATELET # BLD AUTO: 183 10(3)UL (ref 150–450)
POTASSIUM SERPL-SCNC: 4 MMOL/L (ref 3.5–5.1)
PROT SERPL-MCNC: 8.8 G/DL (ref 5.7–8.2)
RBC # BLD AUTO: 5.74 X10(6)UL
SODIUM SERPL-SCNC: 139 MMOL/L (ref 136–145)
TROPONIN I SERPL HS-MCNC: 4 NG/L
WBC # BLD AUTO: 7.4 X10(3) UL (ref 4–11)

## 2024-12-30 PROCEDURE — 99284 EMERGENCY DEPT VISIT MOD MDM: CPT

## 2024-12-30 PROCEDURE — S0028 INJECTION, FAMOTIDINE, 20 MG: HCPCS | Performed by: EMERGENCY MEDICINE

## 2024-12-30 PROCEDURE — 85025 COMPLETE CBC W/AUTO DIFF WBC: CPT | Performed by: EMERGENCY MEDICINE

## 2024-12-30 PROCEDURE — 80053 COMPREHEN METABOLIC PANEL: CPT | Performed by: EMERGENCY MEDICINE

## 2024-12-30 PROCEDURE — 96361 HYDRATE IV INFUSION ADD-ON: CPT

## 2024-12-30 PROCEDURE — 70450 CT HEAD/BRAIN W/O DYE: CPT | Performed by: EMERGENCY MEDICINE

## 2024-12-30 PROCEDURE — 93010 ELECTROCARDIOGRAM REPORT: CPT

## 2024-12-30 PROCEDURE — 84484 ASSAY OF TROPONIN QUANT: CPT | Performed by: EMERGENCY MEDICINE

## 2024-12-30 PROCEDURE — 93005 ELECTROCARDIOGRAM TRACING: CPT

## 2024-12-30 PROCEDURE — 99285 EMERGENCY DEPT VISIT HI MDM: CPT

## 2024-12-30 PROCEDURE — 96374 THER/PROPH/DIAG INJ IV PUSH: CPT

## 2024-12-30 PROCEDURE — 71045 X-RAY EXAM CHEST 1 VIEW: CPT | Performed by: EMERGENCY MEDICINE

## 2024-12-30 PROCEDURE — 96375 TX/PRO/DX INJ NEW DRUG ADDON: CPT

## 2024-12-30 PROCEDURE — 83690 ASSAY OF LIPASE: CPT | Performed by: EMERGENCY MEDICINE

## 2024-12-30 RX ORDER — FAMOTIDINE 10 MG/ML
20 INJECTION, SOLUTION INTRAVENOUS ONCE
Status: COMPLETED | OUTPATIENT
Start: 2024-12-30 | End: 2024-12-30

## 2024-12-30 RX ORDER — LOPERAMIDE HYDROCHLORIDE 2 MG/1
2 TABLET ORAL 2 TIMES DAILY PRN
Qty: 14 TABLET | Refills: 0 | Status: SHIPPED | OUTPATIENT
Start: 2024-12-30 | End: 2025-01-13

## 2024-12-30 RX ORDER — ONDANSETRON 2 MG/ML
4 INJECTION INTRAMUSCULAR; INTRAVENOUS ONCE
Status: COMPLETED | OUTPATIENT
Start: 2024-12-30 | End: 2024-12-30

## 2024-12-30 RX ORDER — DICYCLOMINE HCL 20 MG
20 TABLET ORAL 3 TIMES DAILY PRN
Qty: 30 TABLET | Refills: 0 | Status: SHIPPED | OUTPATIENT
Start: 2024-12-30 | End: 2025-01-29

## 2024-12-30 RX ORDER — FAMOTIDINE 20 MG/1
20 TABLET, FILM COATED ORAL 2 TIMES DAILY PRN
Qty: 14 TABLET | Refills: 0 | Status: SHIPPED | OUTPATIENT
Start: 2024-12-30 | End: 2025-01-06

## 2024-12-30 RX ORDER — ONDANSETRON 4 MG/1
4 TABLET, ORALLY DISINTEGRATING ORAL
Qty: 10 TABLET | Refills: 0 | Status: SHIPPED | OUTPATIENT
Start: 2024-12-30 | End: 2025-01-06

## 2024-12-30 NOTE — DISCHARGE INSTRUCTIONS
Thank you for seeking care at [facility] Emergency Department.  You have been seen and evaluated for vomiting and diarrhea and episode of passing out that also resulted in head injury. Your labs, x-ray and ct of the head today did not show severe findings.    We discussed the results of your workup   Please read the instructions provided   If given prescriptions, take as instructed    Please drink plenty of fluids at home. Progressively advance your diet as tolerated. Drink clear, sugar-free, non-citrus liquids frequently in small amounts. Advance your diet from bland foods (crackers, plain boiled rice, potato, noodles, pasta, oatmeal, boiled egg, boiled vegetables, broths, applesauce, bananas, non-citrus fruits) as tolerated. Avoid greasy, fatty, or fried foods, fast food, junk food or snack food.    Remember, your care process does not end after your visit today. Please follow-up with your doctor within 1-2 days for a follow-up check to ensure you are  improving, to see if you need any further evaluation/testing, or to evaluate for any alternate diagnoses.     Please return to the emergency department if you develop high fevers, have persistent severe abdominal pain, or have bloody stools or vomit, if you are unable to keep down liquids due to frequent vomiting, or if you develop any other new or concerning symptoms as these could be signs of more serious medical illness.    We hope you feel better.

## 2024-12-30 NOTE — ED PROVIDER NOTES
[facility] Note  Patient: Benito Recio Age: 24 year old Sex: male      MRN: [id number]  : 2000    Patient Seen in: [facility] Emergency Department    History     Chief Complaint   Patient presents with    Nausea/Vomiting/Diarrhea    Fall     Stated Complaint: Fall; N/V    History obtained from: patient     Very pleasant 24M hx of alopecia presents to the ER for evaluation of n/v/d and episode of syncope and a head injury. Per pt approximately 3 hours ago he started with nausea, >10 episodes of NBNB emesis and several episodes of NB diarrhea. +subjective fever and chills. States prior to coming to the hospital he was in the bathroom and then somehow passed out. Pt is not exactly sure what happened, but recalls feeling generally weak before this happened. Per family he hit head on bathroom floor and passed out for <1 minute. Pt currently feels nauseated and generally weak with mild generalized headache. No cough, chest pain, or SOB. Reports mild headache. Had some tingling sensation across his face in the waiting room but has since resolved. Denies numbness/tingling/weakness in his extremities, vision changes. +sick contact with similar sx at home. No hx of dvt, no leg pain.     Review of Systems:  Review of Systems  Positive for stated complaint: Fall; N/V. Constitutional and vital signs reviewed. All other systems reviewed and negative except as noted above.    Patient History:  Past Medical History:    Alopecia       Past Surgical History:   Procedure Laterality Date    Appendectomy      2012        Family History   Problem Relation Age of Onset    Hypertension Mother     Lipids Mother     Kidney Disease Mother        Specific Social Determinants of Health:   Social History     Socioeconomic History    Marital status: Single   Tobacco Use    Smoking status: Never    Smokeless tobacco: Never   Vaping Use    Vaping status: Never Used   Substance and Sexual Activity     Alcohol use: Not Currently     Comment: social    Drug use: Not Currently     Types: Cannabis     Comment: rare; once every 3 months     Social Drivers of Health     Financial Resource Strain: Low Risk  (3/2/2023)    Received from [facility]    Overall Financial Resource Strain (CARDIA)     Difficulty of Paying Living Expenses: Not hard at all   Food Insecurity: No Food Insecurity (3/2/2023)    Received [facility]    Hunger Vital Sign     Worried About Running Out of Food in the Last Year: Never true     Ran Out of Food in the Last Year: Never true   Transportation Needs: Unknown (3/2/2023)    Received from [facility]    PRAPARE - Transportation     Lack of Transportation (Non-Medical): No   Housing Stability: Unknown (3/2/2023)    Received from [facility]    Housing Stability Vital Sign     Unstable Housing in the Last Year: No           PSFH elements reviewed from today and agreed except as otherwise stated in HPI.    Physical Exam     ED Triage Vitals [12/30/24 0516]   /60   Pulse 97   Resp 18   Temp 98 °F (36.7 °C)   Temp src Oral   SpO2 99 %   O2 Device None (Room air)       Current:/67   Pulse 66   Temp 98 °F (36.7 °C) (Oral)   Resp 19   SpO2 97%         Physical Exam  Vitals and nursing note reviewed.   Constitutional:       General: He is not in acute distress.     Appearance: He is not ill-appearing.   HENT:      Head: Normocephalic and atraumatic.      Comments: No obvious scalp hematoma nor laceration     Right Ear: External ear normal.      Left Ear: External ear normal.      Nose: Nose normal.      Mouth/Throat:      Mouth: Mucous membranes are dry.      Pharynx: Oropharynx is clear.   Eyes:      Extraocular Movements: Extraocular movements intact.       Conjunctiva/sclera: Conjunctivae normal.      Pupils: Pupils are equal, round, and reactive to light.   Cardiovascular:      Rate and Rhythm: Normal rate and regular rhythm.      Pulses:           Radial pulses are 2+ on the right side and 2+ on the left side.        Dorsalis pedis pulses are 2+ on the right side and 2+ on the left side.      Heart sounds: No murmur heard.  Pulmonary:      Effort: Pulmonary effort is normal. No respiratory distress.      Breath sounds: No wheezing or rales.   Abdominal:      General: There is no distension.      Palpations: Abdomen is soft.      Tenderness: There is no abdominal tenderness. There is no guarding.   Musculoskeletal:         General: No deformity.      Cervical back: Neck supple. No rigidity.      Right lower leg: No edema.      Left lower leg: No edema.      Comments: There is no midline tenderness to the C/T/L spine, no stepoff or deformity. No paraspinal muscle tenderness throughout. There is no tenderness throughout the bilateral upper extremities at shoulders, arms, elbows, forearms, wrists, hands. The pelvis is stable. There is no tenderness on palpation of bilateral anterior hip, thigh, knee, shin, calf, ankle or feet. There are no deformities of major joints noted. Grossly ROM is preserved at all major joints of bilateral upper and lower extremities. Compartments are soft throughout the bilateral upper and lower extremities.      Skin:     General: Skin is warm and dry.      Capillary Refill: Capillary refill takes less than 2 seconds.   Neurological:      General: No focal deficit present.      Mental Status: He is alert and oriented to person, place, and time.      Cranial Nerves: No cranial nerve deficit.      Comments: Strength is 5/5 bilateral upper extremities on handgrip, elbow flexion/extension, shoulder lateral abduction/adduction, bilateral lower extremities on hip flexion and ankle plantar/dorsiflexion. SILT bilat UE and LE throughout and symmetric.  Finger to nose intact bilaterally.            ED Course   Labs:   Labs Reviewed   CBC WITH DIFFERENTIAL WITH PLATELET - Abnormal; Notable for the following components:       Result Value    RBC 5.74 (*)     Lymphocyte Absolute 0.45 (*)     All other components within normal limits   COMP METABOLIC PANEL (14) - Abnormal; Notable for the following components:    Glucose 114 (*)     Calcium, Total 10.6 (*)     Total Protein 8.8 (*)     Albumin 5.2 (*)     Globulin  3.6 (*)     All other components within normal limits   LIPASE - Normal   TROPONIN I HIGH SENSITIVITY - Normal     Radiology findings:  I personally reviewed the images.   CT BRAIN OR HEAD (CPT=70450)    Result Date: 12/30/2024  CONCLUSION:   1. No acute intracranial abnormality. 2. No displaced calvarial fracture.    Dictated by (CST): Edmund Small MD on 12/30/2024 at 6:12 AM     Finalized by (CST): Edmund Small MD on 12/30/2024 at 6:15 AM           EKG as interpreted by me: EKG my interpretation shows normal sinus rhythm rate 70 beats minute, normal axis, normal intervals, findings consistent with benign early repolarization.  No Brugada pattern.  No delta wave.  QTc 399 ms.  Cardiac Monitor: Interpreted by me.   Pulse Readings from Last 1 Encounters:   12/30/24 66   , sinus,         MDM   This patient presents with n/v/d onset acutely this morning, also reports syncope and head injury. Pt VSS on arrival. Exam as above.     Differential diagnoses considered includes, but is not limited to:   Vasovagal vs orthostatic syncope in setting of profuse n/v/d, likely 2/2 viral gastroenteritis and/or food poisoning   Electrolyte abnormality  Lower suspicion dysrhythmia, acs  Low risk PE by wells and PERC negative  Anemia   Skull fx or ICH     Will obtain the following tests: cbc, cmp, trop x1, lipase, CTH, cxr, ecg, monitor on tele   Will administer the following medications/therapies: IV NS, zofran, pepcid     Please see ED course for my independent review  of these tests/imaging results.    ED Course as of 12/30/24 0643  ------------------------------------------------------------  Time: 12/30 0611  Comment: Cbc unremarkable. Cmp mild hypercalcemia, nonspecific. Pt receiving IV fluids. Lipase normal. Trop normal.   ------------------------------------------------------------  Time: 12/30 0611  Comment: EKG my interpretation shows normal sinus rhythm rate 70 beats minute, normal axis, normal intervals, findings consistent with benign early repolarization.  No Brugada pattern.  No delta wave.  QTc 399 ms.  ------------------------------------------------------------  Time: 12/30 0622  Value: CT BRAIN OR HEAD (CPT=70450)  Comment: FINDINGS:     The ventricles and sulci are within normal limits.  The brain volume is age-appropriate.  No hydrocephalus.  There is no midline shift or mass effect.  The basal cisterns are intact.  The midline structures are unremarkable.     There are no areas of abnormal parenchymal attenuation.  The gray-white differentiation is maintained.     No acute intracranial hemorrhage or extra-axial fluid collection.  There are few punctate areas mineralization along the falx cerebri.     The calvarium is intact.  No displaced calvarial fracture.  Minimal ethmoid and sphenoid sinus mucosal thickening.  The mastoid air cells are clear.  The orbits are unremarkable.              Impression  CONCLUSION:     1. No acute intracranial abnormality.  2. No displaced calvarial fracture.    ------------------------------------------------------------  Time: 12/30 0628  Comment: My interpretation of patient's chest x-ray shows no cardiomegaly, no focal lung consolidation, no pneumothorax.  Patient reassessed updated with results.  Vital signs have remained stable here in the emergency department.  ------------------------------------------------------------  Time: 12/30 0632  Comment: Patient reassessed, resting no distress.  States was ambulatory and felt  slightly weak but was able to do so to get to the CT scanner.  States his still having mild abdominal cramping but generally feeling better with fluids.  Discussed results with him and his partner who are at cart side.  I have provided him with crackers, juice and water to p.o. challenge.  If he is able to tolerate we will plan for discharge with close follow-up with his primary doctor as an outpatient or strict return precautions.  He feels comfortable with this plan.  Will give home Zofran, Bentyl, Pepcid, imodium to use as needed for symptoms.  ------------------------------------------------------------  Time: 12/30 0643  Comment: Po chall passed. Pt stable for dc             Procedures:  Procedures        Disposition and Plan     Clinical Impression:  1. Nausea vomiting and diarrhea    2. Closed head injury, initial encounter    3. Syncope, unspecified syncope type        Disposition:  Discharge    Follow-up:  Julio Cantor MD  [address] [place] [id number] [phone]    Schedule an appointment as soon as possible for a visit in 2 day(s)      [facility] Emergency Department  [address] [facility] [address] [phone]  Go to  If symptoms worsen, immediately      Medications Prescribed:  Current Discharge Medication List        START taking these medications    Details   ondansetron 4 MG Oral Tablet Dispersible Take 1 tablet (4 mg total) by mouth every 4 to 6 hours as needed for Nausea.  Qty: 10 tablet, Refills: 0      dicyclomine 20 MG Oral Tab Take 1 tablet (20 mg total) by mouth 3 (three) times daily as needed (abdominal cramping).  Qty: 30 tablet, Refills: 0      famotidine (PEPCID) 20 MG Oral Tab Take 1 tablet (20 mg total) by mouth 2 (two) times daily as needed for Heartburn.  Qty: 14 tablet, Refills: 0      Loperamide HCl 2 MG Oral Tab Take 1 tablet (2 mg total) by mouth 2 (two) times daily as needed for Diarrhea.  Qty: 14 tablet, Refills: 0               This note may  have been created using voice dictation technology and may include inadvertent errors.      Regina Killian,   Attending Physician   Emergency Medicine

## 2024-12-30 NOTE — ED INITIAL ASSESSMENT (HPI)
N/v/d starting 3 hours ago. Patient experiencing abdominal pain. Patient states running to restroom and hitting head on toilet, unsure if he tripped or passed. Possibly unconscious less than a minute per family.

## 2024-12-30 NOTE — ED INITIAL ASSESSMENT (HPI)
Per patient feels \"fuzzy\" and after hitting head. Tingling on right side of face, denies blurred vision.

## 2024-12-31 LAB
ATRIAL RATE: 70 BPM
P AXIS: 37 DEGREES
P-R INTERVAL: 148 MS
Q-T INTERVAL: 370 MS
QRS DURATION: 86 MS
QTC CALCULATION (BEZET): 399 MS
R AXIS: 73 DEGREES
T AXIS: 18 DEGREES
VENTRICULAR RATE: 70 BPM

## 2025-01-24 ENCOUNTER — HOSPITAL ENCOUNTER (OUTPATIENT)
Dept: ELECTROPHYSIOLOGY | Facility: HOSPITAL | Age: 25
Discharge: HOME | End: 2025-01-24
Attending: Other
Payer: COMMERCIAL

## 2025-01-24 ENCOUNTER — HOSPITAL ENCOUNTER (OUTPATIENT)
Dept: MRI IMAGING | Facility: HOSPITAL | Age: 25
Discharge: HOME | End: 2025-01-24
Attending: Other
Payer: COMMERCIAL

## 2025-01-24 DIAGNOSIS — S06.9X9S TRAUMATIC BRAIN INJURY WITH LOSS OF CONSCIOUSNESS, SEQUELA: ICD-10-CM

## 2025-01-24 PROBLEM — S06.9X9A TRAUMATIC BRAIN INJURY WITH LOSS OF CONSCIOUSNESS (HCC): Status: ACTIVE | Noted: 2025-01-24

## 2025-01-24 PROCEDURE — 95816 EEG AWAKE AND DROWSY: CPT

## 2025-01-24 PROCEDURE — 70551 MRI BRAIN STEM W/O DYE: CPT | Performed by: OTHER

## 2025-02-01 ENCOUNTER — TELEMEDICINE (OUTPATIENT)
Dept: FAMILY MEDICINE CLINIC | Facility: CLINIC | Age: 25
End: 2025-02-01
Payer: COMMERCIAL

## 2025-02-01 DIAGNOSIS — M25.562 ACUTE PAIN OF LEFT KNEE: Primary | ICD-10-CM

## 2025-02-01 NOTE — PROGRESS NOTES
Subjective:   Patient ID: Benito Recio is a 24 year old male.    This visit is conducted using Telemedicine with live, interactive video and audio during this Coronavirus pandemic.    Please note that the following visit was completed using two-way, real-time interactive audio and/or video communication.  This has been done in good cholo to provide continuity of care in the best interest of the provider-patient relationship, due to the ongoing public health crisis/national emergency and because of restrictions of visitation.  There are limitations of this visit as no physical exam could be performed.  Every conscious effort was taken to allow for sufficient and adequate time.  This billing was spent on reviewing labs, medications, radiology tests and decision making.  Appropriate medical decision-making and tests are ordered as detailed in the plan of care above    Virtual Telephone Check-In    Benito Recio verbally consents to a Virtual/Telephone Check-In visit on 02/01/25.  Patient has been referred to the [facility] website at [email or web address] to review the yearly Consent to Treat document.    Patient understands and accepts financial responsibility for any deductible, co-insurance and/or co-pays associated with this service.    Duration of the service: 10 minutes      Summary of topics discussed: left knee pain    Pt presents with some left knee pain over the last 1-2 years after hiking over a year ago. Pt was running on hiking trail and felt some pain but kept hiking.   Pt states pains recurs after playing basketball since then. Pain occurs with knee extension and localized in the mid knee area.   No swelling. Pt states feels unstable. No locking.  Pt using a knee sleeve.     Julio Cantor MD                  History/Other:   Review of Systems  No current outpatient medications on file.     Allergies:Allergies[1]    Objective:   Physical Exam  Neurological:      Mental Status: He is  alert.   Psychiatric:         Mood and Affect: Mood normal.         Behavior: Behavior normal.         Assessment & Plan:   1. Acute pain of left knee: old injury  - After discussion, will send to orthopedic for further evaluation and treatment; To call if any significant symptoms.          No orders of the defined types were placed in this encounter.      Meds This Visit:  Requested Prescriptions      No prescriptions requested or ordered in this encounter       Imaging & Referrals:  ORTHOPEDIC - INTERNAL         [1]   Allergies  Allergen Reactions    Seasonal UNKNOWN

## 2025-04-21 ENCOUNTER — OFFICE VISIT (OUTPATIENT)
Dept: FAMILY MEDICINE CLINIC | Facility: CLINIC | Age: 25
End: 2025-04-21
Payer: COMMERCIAL

## 2025-04-21 VITALS
SYSTOLIC BLOOD PRESSURE: 119 MMHG | HEART RATE: 58 BPM | OXYGEN SATURATION: 98 % | HEIGHT: 71 IN | WEIGHT: 178 LBS | TEMPERATURE: 97 F | BODY MASS INDEX: 24.92 KG/M2 | RESPIRATION RATE: 15 BRPM | DIASTOLIC BLOOD PRESSURE: 70 MMHG

## 2025-04-21 DIAGNOSIS — M79.604 PAIN IN BOTH LOWER EXTREMITIES: Primary | ICD-10-CM

## 2025-04-21 DIAGNOSIS — R73.03 PREDIABETES: ICD-10-CM

## 2025-04-21 DIAGNOSIS — M79.605 PAIN IN BOTH LOWER EXTREMITIES: Primary | ICD-10-CM

## 2025-04-21 LAB — HEMOGLOBIN A1C: 5.7 % (ref 4.3–5.6)

## 2025-04-21 PROCEDURE — 83036 HEMOGLOBIN GLYCOSYLATED A1C: CPT

## 2025-04-21 PROCEDURE — 3008F BODY MASS INDEX DOCD: CPT

## 2025-04-21 PROCEDURE — 99213 OFFICE O/P EST LOW 20 MIN: CPT

## 2025-04-21 PROCEDURE — 3078F DIAST BP <80 MM HG: CPT

## 2025-04-21 PROCEDURE — 3074F SYST BP LT 130 MM HG: CPT

## 2025-04-21 NOTE — PROGRESS NOTES
HPI:    Patient ID: Benito Recio is a 24 year old male.    HPI     Patient here in office with complaint of bilateral lower leg pain, started 3 days ago after he returned from a 6-hour road trip.  Describes legs as feeling \"tight\". Denies shortness of breath, leg swelling/erythema/or palpable warmness to touch. Has history of pre-diabetes, last hemoglobin A1c was 5.9 on 9/20/2024, unsure if leg pain related to worsening sugar levels.     Also complains of bilateral hamstring tightness unsure if related to lower leg pain.  Has tried exercising with mild improvement.      Review of Systems   Constitutional: Negative.    Respiratory: Negative.     Cardiovascular: Negative.    Musculoskeletal:         Bilateral lower leg/hamstring pain    Skin: Negative.    Neurological: Negative.    Psychiatric/Behavioral: Negative.            Current Medications[1]  Allergies:Allergies[2]   /70 (BP Location: Left arm, Patient Position: Sitting, Cuff Size: adult)   Pulse 58   Temp 96.7 °F (35.9 °C) (Temporal)   Resp 15   Ht 5' 11\" (1.803 m)   Wt 178 lb (80.7 kg)   SpO2 98%   BMI 24.83 kg/m²   Body mass index is 24.83 kg/m².  PHYSICAL EXAM:   Physical Exam  Vitals reviewed.   Constitutional:       Appearance: Normal appearance.   Cardiovascular:      Rate and Rhythm: Normal rate and regular rhythm.      Heart sounds: Normal heart sounds. No murmur heard.     No friction rub. No gallop.   Pulmonary:      Effort: Pulmonary effort is normal. No respiratory distress.      Breath sounds: Normal breath sounds. No stridor. No wheezing, rhonchi or rales.   Chest:      Chest wall: No tenderness.   Musculoskeletal:      Right lower leg: No edema.      Left lower leg: No edema.      Comments: Negative Nadeem's (bilateral lower legs)    Neurological:      General: No focal deficit present.      Mental Status: He is alert and oriented to person, place, and time.   Psychiatric:         Mood and Affect: Mood normal.          Behavior: Behavior normal.         Thought Content: Thought content normal.         Judgment: Judgment normal.                ASSESSMENT/PLAN:   1. Pain in both lower extremities  - Suspect related to venous insuffiencey vs other  - Advised patient to wear knee-high compression stockings during the day, remove at night  - Go to ER for worsening leg pain with swelling, erythema, and shortness of breath    2. Prediabetes  -Hemoglobin A1c checked during office visit and was 5.7  - Handout given on low glycemic diet  - POC Glycohemoglobin [69264]      Orders Placed This Encounter   Procedures    POC Glycohemoglobin [37484]       Meds This Visit:  Requested Prescriptions      No prescriptions requested or ordered in this encounter       Imaging & Referrals:  None         ID#[id number]         [1]   No current outpatient medications on file.   [2]   Allergies  Allergen Reactions    Seasonal UNKNOWN